# Patient Record
Sex: MALE | Race: WHITE | URBAN - NONMETROPOLITAN AREA
[De-identification: names, ages, dates, MRNs, and addresses within clinical notes are randomized per-mention and may not be internally consistent; named-entity substitution may affect disease eponyms.]

---

## 2020-07-23 ENCOUNTER — OFFICE VISIT (OUTPATIENT)
Age: 35
End: 2020-07-23

## 2020-07-23 VITALS — TEMPERATURE: 98.2 F | HEART RATE: 60 BPM | OXYGEN SATURATION: 97 %

## 2020-07-25 LAB
REPORT: NORMAL
SARS-COV-2: NOT DETECTED
THIS TEST SENT TO: NORMAL

## 2021-03-03 ENCOUNTER — OFFICE VISIT (OUTPATIENT)
Dept: URGENT CARE | Age: 36
End: 2021-03-03
Payer: COMMERCIAL

## 2021-03-03 ENCOUNTER — OFFICE VISIT (OUTPATIENT)
Age: 36
End: 2021-03-03

## 2021-03-03 VITALS
OXYGEN SATURATION: 98 % | TEMPERATURE: 97.9 F | HEIGHT: 72 IN | SYSTOLIC BLOOD PRESSURE: 121 MMHG | RESPIRATION RATE: 20 BRPM | DIASTOLIC BLOOD PRESSURE: 78 MMHG | WEIGHT: 211 LBS | HEART RATE: 76 BPM | BODY MASS INDEX: 28.58 KG/M2

## 2021-03-03 DIAGNOSIS — Z20.822 EXPOSURE TO COVID-19 VIRUS: Primary | ICD-10-CM

## 2021-03-03 DIAGNOSIS — Z11.59 SCREENING FOR VIRAL DISEASE: Primary | ICD-10-CM

## 2021-03-03 PROCEDURE — 99999 PR OFFICE/OUTPT VISIT,PROCEDURE ONLY: CPT | Performed by: NURSE PRACTITIONER

## 2021-03-03 PROCEDURE — 99211 OFF/OP EST MAY X REQ PHY/QHP: CPT | Performed by: NURSE PRACTITIONER

## 2021-03-03 PROCEDURE — G8419 CALC BMI OUT NRM PARAM NOF/U: HCPCS | Performed by: NURSE PRACTITIONER

## 2021-03-03 PROCEDURE — G8427 DOCREV CUR MEDS BY ELIG CLIN: HCPCS | Performed by: NURSE PRACTITIONER

## 2021-03-03 NOTE — PROGRESS NOTES
[address] [place] [address]   [address], [id number]     Phone:  [phone]  Fax:  [phone]      Efrain Jeffries is a 30 y.o. male who presents today for his medical conditions/complaints as noted below. Efrain Jeffries is c/o of Concern For COVID-19 (wants covid test )      Chief Complaint   Patient presents with    Concern For [id number]     wants covid test        HPI:     HPI    Efrain Jeffries presents today for concern for COVID. He had a positive exposure at work and is requesting testing. He works construction. He states he had a low grade fever 2 days ago but this has since resolved. He has no other complaints. His children have also been ill with a stomach virus, but they are improving. He denies shortness of breath, anosmia, or GI symptoms. No past medical history on file. No past surgical history on file. Social History     Tobacco Use    Smoking status: Not on file   Substance Use Topics    Alcohol use: Not on file        No current outpatient medications on file. No current facility-administered medications for this visit. No Known Allergies    No family history on file. Review of Systems   All other systems reviewed and are negative. Objective:     Physical Exam  Vitals signs and nursing note reviewed. Constitutional:       General: He is not in acute distress. Appearance: Normal appearance. He is well-developed. He is not ill-appearing, toxic-appearing or diaphoretic. HENT:      Head: Normocephalic and atraumatic. Right Ear: External ear normal.      Left Ear: External ear normal.      Nose: Nose normal. No congestion or rhinorrhea. Eyes:      General:         Right eye: No discharge. Left eye: No discharge. Conjunctiva/sclera: Conjunctivae normal.   Neck:      Musculoskeletal: Normal range of motion and neck supple. Cardiovascular:      Rate and Rhythm: Normal rate and regular rhythm. Heart sounds: Normal heart sounds. Pulmonary:      Effort: Pulmonary effort is normal. No respiratory distress. Breath sounds: Normal breath sounds. No stridor. No wheezing, rhonchi or rales. Musculoskeletal: Normal range of motion. Lymphadenopathy:      Cervical: No cervical adenopathy. Skin:     General: Skin is warm and dry. Capillary Refill: Capillary refill takes less than 2 seconds. Findings: No rash. Neurological:      Mental Status: He is alert and oriented to person, place, and time. Motor: No weakness. Gait: Gait normal.   Psychiatric:         Mood and Affect: Mood normal.         Behavior: Behavior normal.         /78   Pulse 76   Temp 97.9 °F (36.6 °C) (Temporal)   Resp 20   Ht 6' (1.829 m)   Wt 211 lb (95.7 kg)   SpO2 98%   BMI 28.62 kg/m²     Assessment:      Diagnosis Orders   1. Exposure to COVID-19 virus         No results found for this visit on 03/03/21. Plan:     COVID  Testing today    Quarantine until results return    Return if symptoms worsen or fail to improve. No orders of the defined types were placed in this encounter. No orders of the defined types were placed in this encounter. Patient offered educational materials - see patient instructions for any instruction needed. Discussed use, benefit, and side effects of prescribed medications. All patient questions answered. Instructed to continue current medications, diet and exercise. Patient agreed with treatment plan. Follow up as directed. Patient was advised to go to the ED if condition ever becomes emergent.        Electronically signed by BENSON Pardo on 3/3/2021 at 4:21 PM

## 2021-03-05 LAB — SARS-COV-2, NAA: NOT DETECTED

## 2022-03-14 NOTE — PROGRESS NOTES
Subjective    Mr. Fierro is 36 y.o. male    Chief Complaint: Vas Consult    History of Present Illness  The patient has been pondering the option of a vasectomy for>5 months. Anatomically this is a lower genital tract issue/procedure. With regard to context of the decision, he presently has 3 children. He is . Associated/Relevant symptoms/signs include None. He voices no additional questions about birth control options.     The following portions of the patient's history were reviewed and updated as appropriate: allergies, current medications, past family history, past medical history, past social history, past surgical history and problem list.    Review of Systems   Constitutional: Negative for appetite change and fever.   HENT: Negative for hearing loss and sore throat.    Eyes: Negative for pain and redness.   Respiratory: Negative for cough and shortness of breath.    Cardiovascular: Negative for chest pain and leg swelling.   Gastrointestinal: Negative for anal bleeding, nausea and vomiting.   Endocrine: Negative for cold intolerance and heat intolerance.   Genitourinary: Negative for dysuria, flank pain, frequency, hematuria, penile pain, testicular pain and urgency.   Musculoskeletal: Negative for joint swelling and myalgias.   Skin: Negative for color change and rash.   Allergic/Immunologic: Negative for immunocompromised state.   Neurological: Negative for dizziness and speech difficulty.   Hematological: Negative for adenopathy. Does not bruise/bleed easily.   Psychiatric/Behavioral: Negative for dysphoric mood and suicidal ideas.         Current Outpatient Medications:   •  ALPRAZolam (Xanax) 2 MG tablet, Take 30 minutes prior to procedure, Disp: 1 tablet, Rfl: 0  •  HYDROcodone-acetaminophen (NORCO) 7.5-325 MG per tablet, Take 1 tablet by mouth Every 6 (Six) Hours As Needed for Moderate Pain ., Disp: 12 tablet, Rfl: 0    History reviewed. No pertinent past medical history.    Past Surgical  "History:   Procedure Laterality Date   • HIP SURGERY     • TONSILLECTOMY         Social History     Socioeconomic History   • Marital status:    Tobacco Use   • Smoking status: Never Smoker   • Smokeless tobacco: Never Used   Vaping Use   • Vaping Use: Never used   Substance and Sexual Activity   • Alcohol use: Yes   • Drug use: Never   • Sexual activity: Yes     Partners: Female       History reviewed. No pertinent family history.    Objective    Temp 97.5 °F (36.4 °C) (Temporal)   Ht 182.9 cm (72\")   Wt 90.7 kg (200 lb)   BMI 27.12 kg/m²     Physical Exam  Genitourinary:     Penis: Normal.       Testes: Normal.      Comments: 1cm Right Spermatocele  Normal palpable Vas            No results found for this or any previous visit.  Assessment and Plan    Diagnoses and all orders for this visit:    1. Vasectomy evaluation (Primary)  -     HYDROcodone-acetaminophen (NORCO) 7.5-325 MG per tablet; Take 1 tablet by mouth Every 6 (Six) Hours As Needed for Moderate Pain .  Dispense: 12 tablet; Refill: 0  -     Vasectomy; Future  -     ALPRAZolam (Xanax) 2 MG tablet; Take 30 minutes prior to procedure  Dispense: 1 tablet; Refill: 0        He was given the consent form, pre-vasectomy instruction sheet, and vasectomy booklet. I extensively reviewed with him the likely postoperative recuperative period as well as the need to continue to use contraception until he is notified by us of his sterility. He will have a semen analysis after 20-30 ejaculations. He understands the potential side effects of local anesthesia, bleeding, scrotal hematoma, wound infection, epididymal orchitis, epididymal congestion,  1% risk chronic testicular pain potentially requiring further surgery, sperm granuloma, antisperm antibodies, early recanalization, spontaneous recanalization with pregnancy after demonstration of azoospermia risk of 1 in 2000 and the possible association with prostate cancer. He is aware of alternatives to vasectomy. " He has given this careful consideration and wishes to proceed with a vasectomy.

## 2022-03-16 ENCOUNTER — OFFICE VISIT (OUTPATIENT)
Dept: UROLOGY | Facility: CLINIC | Age: 37
End: 2022-03-16

## 2022-03-16 VITALS — BODY MASS INDEX: 27.09 KG/M2 | HEIGHT: 72 IN | WEIGHT: 200 LBS | TEMPERATURE: 97.5 F

## 2022-03-16 DIAGNOSIS — Z30.09 VASECTOMY EVALUATION: Primary | ICD-10-CM

## 2022-03-16 PROCEDURE — 99203 OFFICE O/P NEW LOW 30 MIN: CPT | Performed by: UROLOGY

## 2022-03-16 RX ORDER — HYDROCODONE BITARTRATE AND ACETAMINOPHEN 7.5; 325 MG/1; MG/1
1 TABLET ORAL EVERY 6 HOURS PRN
Qty: 12 TABLET | Refills: 0 | Status: SHIPPED | OUTPATIENT
Start: 2022-03-16

## 2022-03-16 RX ORDER — ALPRAZOLAM 2 MG/1
TABLET ORAL
Qty: 1 TABLET | Refills: 0 | Status: SHIPPED | OUTPATIENT
Start: 2022-03-16

## 2022-05-09 ENCOUNTER — TELEPHONE (OUTPATIENT)
Dept: UROLOGY | Facility: CLINIC | Age: 37
End: 2022-05-09

## 2022-05-09 NOTE — TELEPHONE ENCOUNTER
Caller:  EDILIA TATUM    Relationship to patient:  SELF    Best call back number: [phone]    Chief complaint: RESCHEDULE VASECTOMY    Type of visit: PROCEDURE    Requested date: NEED TO DISCUSS    If rescheduling, when is the original appointment: 5/13/22    Additional notes:PLEASE CALL PT TO RESCHEDULE PROCEDURE

## 2022-05-09 NOTE — TELEPHONE ENCOUNTER
I called and spoke with patient and got him rescheduled for his vasectomy appointment. Patient voiced understanding of new appointment time.

## 2022-07-22 ENCOUNTER — PROCEDURE VISIT (OUTPATIENT)
Dept: UROLOGY | Facility: CLINIC | Age: 37
End: 2022-07-22

## 2022-07-22 DIAGNOSIS — Z30.2 ENCOUNTER FOR VASECTOMY: ICD-10-CM

## 2022-07-22 PROCEDURE — 55250 REMOVAL OF SPERM DUCT(S): CPT | Performed by: UROLOGY

## 2022-07-22 NOTE — PROGRESS NOTES
No Scalpel Vasectomy Procedure Note    Indications: 37 y.o. male desiring permanent sterilization    Pre-operative Diagnosis: Undesired fertility    Post-operative Diagnosis: Undesired fertility    Anesthesia: Lidocaine 1% without epinephrine     Procedure Details     The risks and benefits of the procedure were discussed at the pre-procedure consultation, and written, informed consent obtained.    Premedicated with Norco 7.5 and Xanax 2mg 30 minutes prior to procedure.    The scrotum was palpated with both testes normal in size and position, no masses palpated. The scrotum was cleansed with warm Betadine and draped in the usual sterile manner.     A vasal sheath block was performed on both the left and right vas.  After adequate anesthesia was established, a small perforation was made in the skin and the right vas was isolated with the ring forceps, dissected free and delivered through the skin perforation.  The right vas was divided, approximately 3 cm portion removed, and each end of the vas was cauterized.  The ends of the vas were replaced in the scrotum through the puncture site.  The left vas was then isolated, divided, cauterized in a similar fashion.  Midportions removed not sent to pathology to confirm because they were grossly normal.     Any bleeding was controlled with electrocautery.  3.0 Chormic interrupted suture was used to close both sites. The puncture site was dry when the procedure was completed. Dressing was applied to both incisions and jock strap placed for scrotal support.    Specimen: None    Condition: Stable    Complications: None    Plan:  1. Continue contraception until negative sperm analysis. Bring 2 semen samples after 20-30 ejaculates  2. Warning signs of infection were reviewed.   3. Patient is taken home by wife with written home care instructions.  • Bedrest X 48 hrs, Ice pack every 3 hours for 24 hrs.    • Call the clinic if excessive pain, bleeding or swelling.